# Patient Record
Sex: FEMALE | Race: WHITE | Employment: FULL TIME | URBAN - METROPOLITAN AREA
[De-identification: names, ages, dates, MRNs, and addresses within clinical notes are randomized per-mention and may not be internally consistent; named-entity substitution may affect disease eponyms.]

---
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
## 2023-05-15 RX ORDER — TESTOSTERONE 16.2 MG/G
41 GEL TRANSDERMAL DAILY
COMMUNITY
Start: 2021-11-07

## 2023-05-15 RX ORDER — TRAZODONE HYDROCHLORIDE 50 MG/1
50 TABLET ORAL
COMMUNITY
Start: 2021-11-06

## 2025-07-18 NOTE — GROUP NOTE
IP  GROUP DOCUMENTATION INDIVIDUAL Group Therapy Note Date: 11/2/2021 Group Start Time: 7833 Group End Time: 1099 Group Topic: Education Group - Inpatient SRM 2 BH NON ACUTE Sharon March 
 
[facility] GROUP DOCUMENTATION GROUP Group Therapy Note Facilitated discussion focused on defining different types of defense mechanisms and being able to recognize some defenses that was used to cope with stress and anxiety Attendees: 11/13 Attendance: Attended Patient's Goal:  Attend group daily Interventions/techniques: Informed and Supported Follows Directions: Followed directions Interactions: Interacted appropriately Mental Status: Calm Behavior/appearance: Cooperative Goals Achieved: Able to engage in interactions, Able to listen to others, Able to self-disclose and Discussed coping Additional Notes:  Receptive to information discussed and was able to share personal example of defenses she used prior to admission such as \"acting out, denial and displacement\" Mao Garcia, CTRS 
 
 
 
 discontinued on 7/12/2025 by Marie Charles MD